# Patient Record
Sex: FEMALE | Race: WHITE | NOT HISPANIC OR LATINO | Employment: FULL TIME
[De-identification: names, ages, dates, MRNs, and addresses within clinical notes are randomized per-mention and may not be internally consistent; named-entity substitution may affect disease eponyms.]

---

## 2017-02-11 ENCOUNTER — RECORDS - HEALTHEAST (OUTPATIENT)
Dept: ADMINISTRATIVE | Facility: OTHER | Age: 42
End: 2017-02-11

## 2017-02-11 ENCOUNTER — HOSPITAL ENCOUNTER (OUTPATIENT)
Dept: CT IMAGING | Facility: HOSPITAL | Age: 42
Discharge: HOME | End: 2017-02-11
Attending: FAMILY MEDICINE

## 2017-02-11 DIAGNOSIS — R10.9 LT FLANK PAIN: ICD-10-CM

## 2017-02-13 ENCOUNTER — COMMUNICATION - HEALTHEAST (OUTPATIENT)
Dept: NEUROSURGERY | Facility: CLINIC | Age: 42
End: 2017-02-13

## 2017-03-22 ENCOUNTER — HOSPITAL ENCOUNTER (OUTPATIENT)
Dept: MRI IMAGING | Facility: HOSPITAL | Age: 42
Discharge: HOME | End: 2017-03-22
Attending: NEUROLOGICAL SURGERY

## 2017-03-22 DIAGNOSIS — D32.9 MENINGIOMA (H): ICD-10-CM

## 2017-04-11 ENCOUNTER — COMMUNICATION - HEALTHEAST (OUTPATIENT)
Dept: NEUROSURGERY | Facility: CLINIC | Age: 42
End: 2017-04-11

## 2017-04-11 DIAGNOSIS — D32.9 MENINGIOMA (H): ICD-10-CM

## 2019-07-25 ENCOUNTER — RECORDS - HEALTHEAST (OUTPATIENT)
Dept: LAB | Facility: CLINIC | Age: 44
End: 2019-07-25

## 2019-07-25 LAB
CHOLEST SERPL-MCNC: 202 MG/DL
FASTING STATUS PATIENT QL REPORTED: ABNORMAL
HDLC SERPL-MCNC: 53 MG/DL
LDLC SERPL CALC-MCNC: 127 MG/DL
TRIGL SERPL-MCNC: 112 MG/DL

## 2020-01-24 ENCOUNTER — RECORDS - HEALTHEAST (OUTPATIENT)
Dept: ADMINISTRATIVE | Facility: OTHER | Age: 45
End: 2020-01-24

## 2020-02-06 ENCOUNTER — COMMUNICATION - HEALTHEAST (OUTPATIENT)
Dept: NEUROSURGERY | Facility: CLINIC | Age: 45
End: 2020-02-06

## 2020-02-11 ENCOUNTER — RECORDS - HEALTHEAST (OUTPATIENT)
Dept: ADMINISTRATIVE | Facility: OTHER | Age: 45
End: 2020-02-11

## 2020-02-11 ENCOUNTER — RECORDS - HEALTHEAST (OUTPATIENT)
Dept: RADIOLOGY | Facility: CLINIC | Age: 45
End: 2020-02-11

## 2020-03-13 ENCOUNTER — HOSPITAL ENCOUNTER (OUTPATIENT)
Dept: MAMMOGRAPHY | Facility: CLINIC | Age: 45
Discharge: HOME | End: 2020-03-13
Attending: OBSTETRICS & GYNECOLOGY

## 2020-03-13 DIAGNOSIS — Z12.31 VISIT FOR SCREENING MAMMOGRAM: ICD-10-CM

## 2020-03-19 ENCOUNTER — AMBULATORY - HEALTHEAST (OUTPATIENT)
Dept: NEUROSURGERY | Facility: CLINIC | Age: 45
End: 2020-03-19

## 2020-03-19 ENCOUNTER — RECORDS - HEALTHEAST (OUTPATIENT)
Dept: RADIOLOGY | Facility: CLINIC | Age: 45
End: 2020-03-19

## 2020-03-19 ENCOUNTER — RECORDS - HEALTHEAST (OUTPATIENT)
Dept: ADMINISTRATIVE | Facility: OTHER | Age: 45
End: 2020-03-19

## 2020-03-24 ENCOUNTER — OFFICE VISIT - HEALTHEAST (OUTPATIENT)
Dept: NEUROSURGERY | Facility: CLINIC | Age: 45
End: 2020-03-24

## 2020-03-24 ENCOUNTER — AMBULATORY - HEALTHEAST (OUTPATIENT)
Dept: NEUROSURGERY | Facility: CLINIC | Age: 45
End: 2020-03-24

## 2020-03-24 DIAGNOSIS — M54.16 LUMBAR RADICULOPATHY: ICD-10-CM

## 2020-03-24 DIAGNOSIS — D32.9 MENINGIOMA (H): ICD-10-CM

## 2020-03-24 RX ORDER — GABAPENTIN 300 MG/1
300 CAPSULE ORAL 3 TIMES DAILY
Status: SHIPPED | COMMUNITY
Start: 2020-03-17 | End: 2023-05-18

## 2020-03-24 ASSESSMENT — MIFFLIN-ST. JEOR: SCORE: 1633.11

## 2020-04-13 ENCOUNTER — COMMUNICATION - HEALTHEAST (OUTPATIENT)
Dept: PHYSICAL MEDICINE AND REHAB | Facility: CLINIC | Age: 45
End: 2020-04-13

## 2020-05-13 ENCOUNTER — COMMUNICATION - HEALTHEAST (OUTPATIENT)
Dept: PHYSICAL MEDICINE AND REHAB | Facility: CLINIC | Age: 45
End: 2020-05-13

## 2020-06-09 ENCOUNTER — HOSPITAL ENCOUNTER (OUTPATIENT)
Dept: PHYSICAL MEDICINE AND REHAB | Facility: CLINIC | Age: 45
Discharge: HOME | End: 2020-06-09
Attending: NEUROLOGICAL SURGERY

## 2020-06-09 DIAGNOSIS — M54.16 LUMBAR RADICULOPATHY: ICD-10-CM

## 2020-06-09 ASSESSMENT — MIFFLIN-ST. JEOR: SCORE: 1633.11

## 2020-09-10 ENCOUNTER — HOSPITAL ENCOUNTER (OUTPATIENT)
Dept: MRI IMAGING | Facility: CLINIC | Age: 45
Discharge: HOME | End: 2020-09-10
Attending: NEUROLOGICAL SURGERY

## 2020-09-10 DIAGNOSIS — D32.9 MENINGIOMA (H): ICD-10-CM

## 2020-09-15 ENCOUNTER — OFFICE VISIT - HEALTHEAST (OUTPATIENT)
Dept: NEUROSURGERY | Facility: CLINIC | Age: 45
End: 2020-09-15

## 2020-09-15 ENCOUNTER — COMMUNICATION - HEALTHEAST (OUTPATIENT)
Dept: NEUROSURGERY | Facility: CLINIC | Age: 45
End: 2020-09-15

## 2020-09-15 DIAGNOSIS — I67.1 NONRUPTURED CEREBRAL ANEURYSM: ICD-10-CM

## 2020-09-15 DIAGNOSIS — D32.9 MENINGIOMA (H): ICD-10-CM

## 2020-09-15 ASSESSMENT — MIFFLIN-ST. JEOR: SCORE: 1633.11

## 2021-06-04 VITALS
HEIGHT: 68 IN | SYSTOLIC BLOOD PRESSURE: 128 MMHG | WEIGHT: 210 LBS | HEART RATE: 97 BPM | BODY MASS INDEX: 31.83 KG/M2 | DIASTOLIC BLOOD PRESSURE: 93 MMHG | OXYGEN SATURATION: 97 %

## 2021-06-04 VITALS
SYSTOLIC BLOOD PRESSURE: 125 MMHG | BODY MASS INDEX: 31.83 KG/M2 | HEIGHT: 68 IN | HEART RATE: 90 BPM | DIASTOLIC BLOOD PRESSURE: 86 MMHG | WEIGHT: 210 LBS | OXYGEN SATURATION: 98 %

## 2021-06-04 VITALS — HEIGHT: 68 IN | BODY MASS INDEX: 31.83 KG/M2 | WEIGHT: 210 LBS

## 2021-06-05 NOTE — TELEPHONE ENCOUNTER
Patient had MRI brain w/o on 1/24/19 through Neurologist Dr. Serrato at [facility]. Pt stated she was told her meningoma has slightly increased in size. Patient will hand carry report and MRI from [facility] but please order any other imaging needed.

## 2021-06-07 NOTE — PROGRESS NOTES
Neurosurgery consultation was requested by: Analilia Angulo MD  Pain: Lumbar  Radicular Pain is present:  radiates into the right foot  Lhermitte sign: No  Motor complaints: Denies  Sensory complaints: numbness tingling occasional  Gait and balance issues: No  Bowel or bladder issues: No  Duration of SX is: 1 yr  The symptoms are worse with: walking  The symptoms are better with: laying sitting  Injury: Denies  Severity is: moderate-severe  Patient has tried the following conservative measures: chiropractor, injections, and PT  Missy Camara CMA

## 2021-06-07 NOTE — PROGRESS NOTES
I had the pleasure of seeing Patricia Pranke in follow-up in my neurosurgery clinic and for a new finding of radiculopathy on the right side secondary to a right lumbar 5 6 disc herniation.  Mary Lou is a 45-year-old whom I have been following for meningioma the left CP angle.  She has been doing well and has no new symptoms.  She of course has continued pain on the left side of her face as related to her surgery.  Otherwise she is denying any swallowing difficulties hearing loss or facial weakness.  Her most recent MRI her lesion has increased by few millimeters to 13 x 10 mm from 10 x 8 mm.  Regarding her radiculopathy she has had this for a long time and has had injections x2 with improvement in her pain which she describes a pain in the right thigh right lateral leg and medial dorsal aspect of the foot on the right.  She is also tried physical therapy.  She denies any weakness.  She had seen my orthopedic colleague Dr. Gunderson who recommended L5-6 microdiscectomy she is also seeing me for second opinion.    On physical exam patient is very pleasant and appropriate  Speech is clear fluent and appropriate  Face is symmetric throughout the forehead eyes and mouth  Extraocular muscles are intact bilaterally  Tongue and uvula elevate in the midline  Strength is full throughout the lower extremities  Gait is nonantalgic    Assessment and plan:  Mary Lou is a very pleasant 45-year-old with a presumed meningioma in the left CP angle.  Since this is grown in the leticia-of 2 years although slightly, I would recommend repeating an MRI with and without contrast of the brain in 6 months.  Should she develop any signs symptoms of growth from this I will see her sooner.  Regarding her L5-L6 disc herniation and radiculopathy I agree with microlumbar discectomy.  However her last injection was in August so I recommend another injection on the right side.  I will see her back in my clinic in 4 to 6 weeks if she has had no  improvement I would also recommend microdiscectomy I did encourage her to feel free to stay with my colleague Dr. Gunderson who is an excellent surgeon I would also be happy to take care of her as well if she chose to have me do her surgery.   Thank you for giving me the opportunity to see this very pleasant patient.  If you have any questions about him or any othe patient's please feel free to contact me.  Of note I spent greater than 45 minutes counseling the patient regarding her pathology and treatment plan.    Jason Wilder MD, FAANS

## 2021-06-07 NOTE — TELEPHONE ENCOUNTER
Patient contacted with updated provider recommendation that the lumbar epidural steroid injection the patient was currently scheduled to have completed by Dr. Yoon on 4/23/20 should now be scheduled on 5/18/20 or later due to safety concerns with the COVID-19 outbreak.    Rationale for the recommendation was provided to patient's stated understanding.    The patient did clarify that she does have a gadolinium contrast dye allergy, but has tolerated the iodine contrast dye that has been used with previous lumbar ESIs at [facility].    The patient was informed that she should contact the [facility] if her condition worsened or if she was interested in scheduling a phone appointment with a provider.    The patient verbalized understanding of the plan and was informed that a member of the [facility] scheduling team would contact her to reschedule her injection appointment.

## 2021-06-08 NOTE — TELEPHONE ENCOUNTER
Patient contacted to make her aware that the right L5-6 epidural steroid injection she was scheduled to have at the [facility] on 5/21/20 by Dr. Yoon was being recommended to be rescheduled until 7/6/20 or later due to safety concerns regarding the COVID-19 outbreak.  Rationale for the recommendation was provided to the patient's verbalized understanding.    The patient states that her pain is tolerable most days, but that the pain does limit her activities of daily living and she is unsure she can wait until July to have the procedure.    The patient was made that given her circumstance Dr. Yoon would be able to perform her procedure on 5/28/20 and she was interested in scheduling for that date.    Procedure requirements were reviewed with the patient and she was informed that she would need to wear a mask the day of her injection.    She was encouraged to contact the [facility] if she had any questions or concerns prior to her scheduled injection appointment date of 5/28/20.

## 2021-06-08 NOTE — PATIENT INSTRUCTIONS - HE
DISCHARGE INSTRUCTIONS    During office hours (8:00 a.m.- 4:00 p.m.) questions or concerns may be answered  by calling [facility] Navigation Nurses at  [phone].  Messages received after hours will be returned the following business day.      In the case of an emergency, please dial 911 or seek assistance at the nearest Emergency Room/Urgent Care facility.     All Patients:    ? You may experience an increase in your symptoms for the first 2 days (It may take anywhere between 2 days- 2 weeks for the steroid to have maximum effect).    ? You may use ice on the injection site, as frequently as 20 minutes each hour if needed.    ? You may take your pain medicine.    ? You may continue taking your regular medication after your injection. If you have had a Medial Branch Block you may resume pain medication once your pain diary is completed.    ? You may shower. No swimming, tub bath or hot tub for 48 hours.  You may remove your bandaid/bandage as soon as you are home.    ? You may resume light activities, as tolerated.    ? Resume your usual diet as tolerated.    ? It is strongly advised that you do not drive for 1-3 hours post injection.    ? If you have had oral sedation:  Do not drive for 8 hours post injection.      ? If you have had IV sedation:  Do not drive for 24 hours post injection.  Do not operate hazardous machinery or make important personal/business decisions for 24 hours.      POSSIBLE STEROID SIDE EFFECTS (If steroid/cortisone was used for your procedure)    -If you experience these symptoms, it should only last for a short period      Swelling of the legs                Skin redness (flushing)       Mouth (oral) irritation     Blood sugar (glucose) levels              Sweats                      Mood changes    Headache    Sleeplessness         POSSIBLE PROCEDURE SIDE EFFECTS  -Call the [facility] if you are concerned    Increased Pain             Increased numbness/tingling         Nausea/Vomiting            Bruising/bleeding at site        Redness or swelling                                                Difficulty walking        Weakness             Fever greater than 100.5    *In the event of a severe headache after an epidural steroid injection that is relieved by lying down, please call the [facility] to speak with a clinical staff member*

## 2021-06-11 NOTE — TELEPHONE ENCOUNTER
Called and let Mary Lou know that Dr. Wilder wants yearly brain MRI without contrast for surveillance of her CPA angle meningioma.  Ordered in MRI 1 year with CC.  Evelin Bell, RN, CNRN

## 2021-06-11 NOTE — PATIENT INSTRUCTIONS - HE
Imaging was stable, we will see you for follow up imaging in 6-12 months. No contrast. Call us if you have new symptoms, questions/concerns.

## 2021-06-11 NOTE — PROGRESS NOTES
"CHART NOTE     DATE OF SERVICE:  9/15/2020     : 1975   45 y.o.     ASSESSMENT :   Stable CP angle meningioma.  No new symptoms. Her back and right leg leg pain is much improved compared to prior as well. She is following with Dr. Gunderson.    PLAN:   Per Dr. Wilder, yearly MRI without contrast for meningioma.    HPI:  From Dr. wilder's previous consultation \"Mary Lou is a 45-year-old whom I have been following for meningioma the left CP angle.  She has been doing well and has no new symptoms.  She of course has continued pain on the left side of her face as related to her surgery.  Otherwise she is denying any swallowing difficulties hearing loss or facial weakness.  Her most recent MRI her lesion has increased by few millimeters to 13 x 10 mm from 10 x 8 mm.  Regarding her radiculopathy she has had this for a long time and has had injections x2 with improvement in her pain which she describes a pain in the right thigh right lateral leg and medial dorsal aspect of the foot on the right.  She is also tried physical therapy.  She denies any weakness.  She had seen my orthopedic colleague Dr. Gunderson who recommended L5-6 microdiscectomy she is also seeing me for second opinion\"          PAST MEDICAL HISTORY, SURGICAL HISTORY, REVIEW OF SYMPTOMS, MEDICATIONS AND ALLERGIES:  Past medical history, surgical history, ROS, medications and allergies reviewed with patient and remain unchanged from previous visit.    Past Medical History:   Diagnosis Date     Anemia      Menorrhagia        PHYSICAL EXAM:    BP (!) 128/93   Pulse 97   Ht 5' 7.5\" (1.715 m)   Wt 210 lb (95.3 kg)   SpO2 97%   BMI 32.41 kg/m      Neurological exam reveals:  Respirations easy, non-labored.   Skin: W/D/I. No rashes, lesions or breaks in integrity.   Recent and remote memory intact, fund of knowledge wnl.    Alert and oriented x3, speech fluent and appropriate.   Comprehension intact with 2 step crossover command.   Finger nose " finger smooth and accurate  Counts by Serial 7s with ease, Spells WORLD forward and in reverse.   PERRL, EOMI, No nystagmus,   Face symmetric, tongue midline, Uvula midline,  palate rises with phonation   Shoulder shrug equal  BONE x5/5  No extremity edema noted.   Can heel/toe walk, do toe rises and squats without difficulty.   Muscle Bulk and tone wnl.   Reflexes: No pathological reflexes   Gait and station:Normal  Incision: CDI without erythema or edema  NDI/CARMINE:      RADIOGRAPHIC IMAGING: MRI reviewed, and reviewed with Dr. Wilder.  Films personally reviewed and interpreted.   Reviewed imaging with patient and family.      Alice Sahu CNP  [facility]  O: [phone]  P: [phone]

## 2021-10-07 ENCOUNTER — TELEPHONE (OUTPATIENT)
Dept: NEUROSURGERY | Facility: CLINIC | Age: 46
End: 2021-10-07

## 2021-10-07 DIAGNOSIS — D32.9 MENINGIOMA (H): Primary | ICD-10-CM

## 2021-10-07 NOTE — TELEPHONE ENCOUNTER
New order placed for brain MRI without contrast per last NP note and pt's allergy to contrast.     Donan Holman RN

## 2021-11-03 ENCOUNTER — HOSPITAL ENCOUNTER (OUTPATIENT)
Dept: MRI IMAGING | Facility: HOSPITAL | Age: 46
Discharge: HOME | End: 2021-11-03
Attending: NEUROLOGICAL SURGERY | Admitting: NEUROLOGICAL SURGERY
Payer: COMMERCIAL

## 2021-11-03 DIAGNOSIS — D32.9 MENINGIOMA (H): ICD-10-CM

## 2021-11-03 PROCEDURE — 70551 MRI BRAIN STEM W/O DYE: CPT

## 2021-11-04 ENCOUNTER — OFFICE VISIT (OUTPATIENT)
Dept: NEUROSURGERY | Facility: CLINIC | Age: 46
End: 2021-11-04
Payer: COMMERCIAL

## 2021-11-04 VITALS
SYSTOLIC BLOOD PRESSURE: 118 MMHG | OXYGEN SATURATION: 98 % | HEART RATE: 88 BPM | DIASTOLIC BLOOD PRESSURE: 78 MMHG | RESPIRATION RATE: 18 BRPM

## 2021-11-04 DIAGNOSIS — D32.9 MENINGIOMA (H): Primary | ICD-10-CM

## 2021-11-04 PROCEDURE — 99213 OFFICE O/P EST LOW 20 MIN: CPT | Performed by: NURSE PRACTITIONER

## 2021-11-04 NOTE — LETTER
11/4/2021         RE: Patricia A Pranke  [address] [facility] [address]        Dear Colleague,    Thank you for referring your patient, Patricia A Pranke, to the [facility]. Please see a copy of my visit note below.    Neurosurgery clinic note:  11/9/21    A/P:   Mary Lou is a 45yo F chronic left facial pain r/t sinus surgery in 2015 who we've been following since 2015 for an incidentally found left CP angle meningioma. Scans were stable from Nov 2015-Mar 2017. Then her lesion was found to have increased by few millimeters in Jan 2020. Repeat scans in March 2020 and sept 2020 were stable. Recommended repeat MRI in 1 yr.     No new symptoms. Denies HA, weakness, dizziness, n/v, hearing loss, or swallowing difficulties. Her new MRI brain is stable today. We will plan to repeat another brain MRI without contrast in 1 yr, sooner if concerns. If several annual scans are stable, can likely go to every 2-3 yrs.    PLAN:   1. Continue annual brain MRIs without contrast  2. If any s/s growth, call sooner and we would repeat imaging sooner      PHYSICAL EXAM:      Recent and remote memory intact, fund of knowledge wnl.    Alert and oriented x3, speech fluent and appropriate.     Finger nose finger smooth and accurate  PERRL, EOMI, No nystagmus,     Face symmetric, tongue midline, Uvula midline,  palate rises with phonation     Shoulder shrug equal    BONE x4, full strength throughout both upper and lower extremities    Sensation of upper and lower extremities is intact    Gait and station:Normal    IMAGING:   Brain MRI images were personally reviewed and shown to patient-     EXAM: MR BRAIN WO CONTRAST  LOCATION: [facility]  DATE/TIME: 9/10/2020 11:28 AM     INDICATION: Brain mass or lesion, follow-up.  COMPARISON: Head MRI 03/22/2017 and head MRI 09/11/2015.  TECHNIQUE: Routine multiplanar multisequence head MRI without intravenous contrast.     FINDINGS:  INTRACRANIAL  CONTENTS: No acute or subacute infarct. 12.1 x 8.2 mm presumed meningioma in the left cerebellopontine angle cistern. It measured 11.7 x 6.6 mm on 03/22/2017 and 12.0 x 7.7 mm on 09/11/2015. Findings are not significantly changed when   accounting for differences in image acquisition. Scattered nonspecific T2/FLAIR hyperintensities within the cerebral white matter most consistent with mild chronic microvascular ischemic change. Normal ventricles and sulci. Normal position of the   cerebellar tonsils.      SELLA: No abnormality accounting for technique.     OSSEOUS STRUCTURES/SOFT TISSUES: Normal marrow signal. The major intracranial vascular flow voids are maintained.      ORBITS: No abnormality accounting for technique.      SINUSES/MASTOIDS: No paranasal sinus mucosal disease. No middle ear or mastoid effusion.      IMPRESSION:  1.  12.1 x 8.7 mm presumed meningioma in the left cerebellopontine angle cistern.  2.  When accounting for differences in image acquisition it has not significantly changed dating back to 09/11/2015.  3.  Incidental scattered foci of white matter T2 prolongation. Unchanged.        Alicia Covington FNP-C  [facility]  O. [phone]        Again, thank you for allowing me to participate in the care of your patient.        Sincerely,        Alicia Covington, YANDY CNP

## 2021-11-09 NOTE — PROGRESS NOTES
Neurosurgery clinic note:  11/9/21    A/P:   Mary Lou is a 47yo F chronic left facial pain r/t sinus surgery in 2015 who we've been following since 2015 for an incidentally found left CP angle meningioma. Scans were stable from Nov 2015-Mar 2017. Then her lesion was found to have increased by few millimeters in Jan 2020. Repeat scans in March 2020 and sept 2020 were stable. Recommended repeat MRI in 1 yr.     No new symptoms. Denies HA, weakness, dizziness, n/v, hearing loss, or swallowing difficulties. Her new MRI brain is stable today. We will plan to repeat another brain MRI without contrast in 1 yr, sooner if concerns. If several annual scans are stable, can likely go to every 2-3 yrs.    PLAN:   1. Continue annual brain MRIs without contrast  2. If any s/s growth, call sooner and we would repeat imaging sooner      PHYSICAL EXAM:      Recent and remote memory intact, fund of knowledge wnl.    Alert and oriented x3, speech fluent and appropriate.     Finger nose finger smooth and accurate  PERRL, EOMI, No nystagmus,     Face symmetric, tongue midline, Uvula midline,  palate rises with phonation     Shoulder shrug equal    BONE x4, full strength throughout both upper and lower extremities    Sensation of upper and lower extremities is intact    Gait and station:Normal    IMAGING:   Brain MRI images were personally reviewed and shown to patient-     EXAM: MR BRAIN WO CONTRAST  LOCATION: [facility]  DATE/TIME: 9/10/2020 11:28 AM     INDICATION: Brain mass or lesion, follow-up.  COMPARISON: Head MRI 03/22/2017 and head MRI 09/11/2015.  TECHNIQUE: Routine multiplanar multisequence head MRI without intravenous contrast.     FINDINGS:  INTRACRANIAL CONTENTS: No acute or subacute infarct. 12.1 x 8.2 mm presumed meningioma in the left cerebellopontine angle cistern. It measured 11.7 x 6.6 mm on 03/22/2017 and 12.0 x 7.7 mm on 09/11/2015. Findings are not significantly changed when   accounting for differences  in image acquisition. Scattered nonspecific T2/FLAIR hyperintensities within the cerebral white matter most consistent with mild chronic microvascular ischemic change. Normal ventricles and sulci. Normal position of the   cerebellar tonsils.      SELLA: No abnormality accounting for technique.     OSSEOUS STRUCTURES/SOFT TISSUES: Normal marrow signal. The major intracranial vascular flow voids are maintained.      ORBITS: No abnormality accounting for technique.      SINUSES/MASTOIDS: No paranasal sinus mucosal disease. No middle ear or mastoid effusion.      IMPRESSION:  1.  12.1 x 8.7 mm presumed meningioma in the left cerebellopontine angle cistern.  2.  When accounting for differences in image acquisition it has not significantly changed dating back to 09/11/2015.  3.  Incidental scattered foci of white matter T2 prolongation. Unchanged.        Alicia Covington P-C  [facility]  O. [phone]

## 2021-12-18 ENCOUNTER — HOSPITAL ENCOUNTER (OUTPATIENT)
Dept: MAMMOGRAPHY | Facility: CLINIC | Age: 46
Discharge: HOME | End: 2021-12-18
Attending: OBSTETRICS & GYNECOLOGY | Admitting: OBSTETRICS & GYNECOLOGY
Payer: COMMERCIAL

## 2021-12-18 DIAGNOSIS — Z12.31 SCREENING MAMMOGRAM, ENCOUNTER FOR: ICD-10-CM

## 2021-12-18 PROCEDURE — 77063 BREAST TOMOSYNTHESIS BI: CPT

## 2022-01-15 ENCOUNTER — HEALTH MAINTENANCE LETTER (OUTPATIENT)
Age: 47
End: 2022-01-15

## 2022-12-26 ENCOUNTER — HEALTH MAINTENANCE LETTER (OUTPATIENT)
Age: 47
End: 2022-12-26

## 2023-04-16 ENCOUNTER — HEALTH MAINTENANCE LETTER (OUTPATIENT)
Age: 48
End: 2023-04-16

## 2023-05-09 ENCOUNTER — TRANSFERRED RECORDS (OUTPATIENT)
Dept: HEALTH INFORMATION MANAGEMENT | Facility: CLINIC | Age: 48
End: 2023-05-09
Payer: COMMERCIAL

## 2023-05-18 RX ORDER — NORETHINDRONE ACETATE 5 MG
5 TABLET ORAL DAILY
COMMUNITY

## 2023-05-18 RX ORDER — CITALOPRAM HYDROBROMIDE 20 MG/1
20 TABLET ORAL DAILY
Status: ON HOLD | COMMUNITY
End: 2023-05-23

## 2023-05-18 RX ORDER — BACLOFEN 10 MG/1
10 TABLET ORAL 3 TIMES DAILY
Status: ON HOLD | COMMUNITY
End: 2023-05-23

## 2023-05-23 ENCOUNTER — ANESTHESIA EVENT (OUTPATIENT)
Dept: SURGERY | Facility: CLINIC | Age: 48
End: 2023-05-23
Payer: COMMERCIAL

## 2023-05-23 ENCOUNTER — HOSPITAL ENCOUNTER (OUTPATIENT)
Facility: CLINIC | Age: 48
Discharge: HOME | End: 2023-05-23
Attending: OBSTETRICS & GYNECOLOGY | Admitting: OBSTETRICS & GYNECOLOGY
Payer: COMMERCIAL

## 2023-05-23 ENCOUNTER — ANESTHESIA (OUTPATIENT)
Dept: SURGERY | Facility: CLINIC | Age: 48
End: 2023-05-23
Payer: COMMERCIAL

## 2023-05-23 VITALS
HEART RATE: 61 BPM | OXYGEN SATURATION: 94 % | HEIGHT: 66 IN | TEMPERATURE: 99.2 F | WEIGHT: 230 LBS | SYSTOLIC BLOOD PRESSURE: 102 MMHG | DIASTOLIC BLOOD PRESSURE: 70 MMHG | RESPIRATION RATE: 16 BRPM | BODY MASS INDEX: 36.96 KG/M2

## 2023-05-23 DIAGNOSIS — N84.0 ABNORMAL UTERINE BLEEDING DUE TO ENDOMETRIAL POLYP: Primary | ICD-10-CM

## 2023-05-23 DIAGNOSIS — N93.9 ABNORMAL UTERINE BLEEDING DUE TO ENDOMETRIAL POLYP: Primary | ICD-10-CM

## 2023-05-23 PROBLEM — Z14.8 CARRIER OF GENE FOR LYNCH SYNDROME: Status: ACTIVE | Noted: 2023-05-23

## 2023-05-23 LAB
ABO/RH(D): NORMAL
ANTIBODY SCREEN: NEGATIVE
BASOPHILS # BLD AUTO: 0.1 10E3/UL (ref 0–0.2)
BASOPHILS NFR BLD AUTO: 1 %
EOSINOPHIL # BLD AUTO: 0.2 10E3/UL (ref 0–0.7)
EOSINOPHIL NFR BLD AUTO: 3 %
ERYTHROCYTE [DISTWIDTH] IN BLOOD BY AUTOMATED COUNT: 15.1 % (ref 10–15)
HCG UR QL: NEGATIVE
HCT VFR BLD AUTO: 36.7 % (ref 35–47)
HGB BLD-MCNC: 11.1 G/DL (ref 11.7–15.7)
IMM GRANULOCYTES # BLD: 0 10E3/UL
IMM GRANULOCYTES NFR BLD: 0 %
LYMPHOCYTES # BLD AUTO: 1.3 10E3/UL (ref 0.8–5.3)
LYMPHOCYTES NFR BLD AUTO: 20 %
MCH RBC QN AUTO: 24.1 PG (ref 26.5–33)
MCHC RBC AUTO-ENTMCNC: 30.2 G/DL (ref 31.5–36.5)
MCV RBC AUTO: 80 FL (ref 78–100)
MONOCYTES # BLD AUTO: 0.4 10E3/UL (ref 0–1.3)
MONOCYTES NFR BLD AUTO: 6 %
NEUTROPHILS # BLD AUTO: 4.8 10E3/UL (ref 1.6–8.3)
NEUTROPHILS NFR BLD AUTO: 70 %
NRBC # BLD AUTO: 0 10E3/UL
NRBC BLD AUTO-RTO: 0 /100
PLATELET # BLD AUTO: 274 10E3/UL (ref 150–450)
RBC # BLD AUTO: 4.61 10E6/UL (ref 3.8–5.2)
SPECIMEN EXPIRATION DATE: NORMAL
WBC # BLD AUTO: 6.8 10E3/UL (ref 4–11)

## 2023-05-23 PROCEDURE — 258N000001 HC RX 258: Performed by: OBSTETRICS & GYNECOLOGY

## 2023-05-23 PROCEDURE — 88305 TISSUE EXAM BY PATHOLOGIST: CPT | Mod: 26 | Performed by: PATHOLOGY

## 2023-05-23 PROCEDURE — 88305 TISSUE EXAM BY PATHOLOGIST: CPT | Mod: TC | Performed by: OBSTETRICS & GYNECOLOGY

## 2023-05-23 PROCEDURE — 250N000011 HC RX IP 250 OP 636: Performed by: NURSE ANESTHETIST, CERTIFIED REGISTERED

## 2023-05-23 PROCEDURE — 36415 COLL VENOUS BLD VENIPUNCTURE: CPT | Performed by: OBSTETRICS & GYNECOLOGY

## 2023-05-23 PROCEDURE — 258N000003 HC RX IP 258 OP 636: Performed by: ANESTHESIOLOGY

## 2023-05-23 PROCEDURE — 86901 BLOOD TYPING SEROLOGIC RH(D): CPT | Performed by: OBSTETRICS & GYNECOLOGY

## 2023-05-23 PROCEDURE — 86850 RBC ANTIBODY SCREEN: CPT | Performed by: OBSTETRICS & GYNECOLOGY

## 2023-05-23 PROCEDURE — 370N000017 HC ANESTHESIA TECHNICAL FEE, PER MIN: Performed by: OBSTETRICS & GYNECOLOGY

## 2023-05-23 PROCEDURE — 250N000011 HC RX IP 250 OP 636: Performed by: OBSTETRICS & GYNECOLOGY

## 2023-05-23 PROCEDURE — 360N000076 HC SURGERY LEVEL 3, PER MIN: Performed by: OBSTETRICS & GYNECOLOGY

## 2023-05-23 PROCEDURE — 85025 COMPLETE CBC W/AUTO DIFF WBC: CPT | Performed by: OBSTETRICS & GYNECOLOGY

## 2023-05-23 PROCEDURE — 88341 IMHCHEM/IMCYTCHM EA ADD ANTB: CPT | Mod: 26 | Performed by: PATHOLOGY

## 2023-05-23 PROCEDURE — 999N000141 HC STATISTIC PRE-PROCEDURE NURSING ASSESSMENT: Performed by: OBSTETRICS & GYNECOLOGY

## 2023-05-23 PROCEDURE — 250N000009 HC RX 250: Performed by: OBSTETRICS & GYNECOLOGY

## 2023-05-23 PROCEDURE — 81025 URINE PREGNANCY TEST: CPT | Performed by: OBSTETRICS & GYNECOLOGY

## 2023-05-23 PROCEDURE — 710N000012 HC RECOVERY PHASE 2, PER MINUTE: Performed by: OBSTETRICS & GYNECOLOGY

## 2023-05-23 PROCEDURE — 272N000001 HC OR GENERAL SUPPLY STERILE: Performed by: OBSTETRICS & GYNECOLOGY

## 2023-05-23 PROCEDURE — 88342 IMHCHEM/IMCYTCHM 1ST ANTB: CPT | Mod: 26 | Performed by: PATHOLOGY

## 2023-05-23 PROCEDURE — 250N000013 HC RX MED GY IP 250 OP 250 PS 637: Performed by: OBSTETRICS & GYNECOLOGY

## 2023-05-23 RX ORDER — NALOXONE HYDROCHLORIDE 0.4 MG/ML
0.2 INJECTION, SOLUTION INTRAMUSCULAR; INTRAVENOUS; SUBCUTANEOUS
Status: DISCONTINUED | OUTPATIENT
Start: 2023-05-23 | End: 2023-05-23 | Stop reason: HOSPADM

## 2023-05-23 RX ORDER — IBUPROFEN 200 MG
800 TABLET ORAL EVERY 6 HOURS PRN
COMMUNITY
Start: 2023-05-23

## 2023-05-23 RX ORDER — CEFAZOLIN SODIUM/WATER 2 G/20 ML
2 SYRINGE (ML) INTRAVENOUS SEE ADMIN INSTRUCTIONS
Status: DISCONTINUED | OUTPATIENT
Start: 2023-05-23 | End: 2023-05-23 | Stop reason: HOSPADM

## 2023-05-23 RX ORDER — FENTANYL CITRATE 50 UG/ML
50 INJECTION, SOLUTION INTRAMUSCULAR; INTRAVENOUS EVERY 5 MIN PRN
Status: DISCONTINUED | OUTPATIENT
Start: 2023-05-23 | End: 2023-05-23 | Stop reason: HOSPADM

## 2023-05-23 RX ORDER — ACETAMINOPHEN 325 MG/1
975 TABLET ORAL ONCE
Status: COMPLETED | OUTPATIENT
Start: 2023-05-23 | End: 2023-05-23

## 2023-05-23 RX ORDER — ONDANSETRON 4 MG/1
4 TABLET, ORALLY DISINTEGRATING ORAL EVERY 30 MIN PRN
Status: DISCONTINUED | OUTPATIENT
Start: 2023-05-23 | End: 2023-05-23 | Stop reason: HOSPADM

## 2023-05-23 RX ORDER — ACETAMINOPHEN 325 MG/1
975 TABLET ORAL ONCE
Status: DISCONTINUED | OUTPATIENT
Start: 2023-05-23 | End: 2023-05-23 | Stop reason: HOSPADM

## 2023-05-23 RX ORDER — HYDROMORPHONE HCL IN WATER/PF 6 MG/30 ML
0.4 PATIENT CONTROLLED ANALGESIA SYRINGE INTRAVENOUS EVERY 5 MIN PRN
Status: DISCONTINUED | OUTPATIENT
Start: 2023-05-23 | End: 2023-05-23 | Stop reason: HOSPADM

## 2023-05-23 RX ORDER — CEFAZOLIN SODIUM/WATER 2 G/20 ML
2 SYRINGE (ML) INTRAVENOUS
Status: COMPLETED | OUTPATIENT
Start: 2023-05-23 | End: 2023-05-23

## 2023-05-23 RX ORDER — LIDOCAINE 40 MG/G
CREAM TOPICAL
Status: DISCONTINUED | OUTPATIENT
Start: 2023-05-23 | End: 2023-05-23 | Stop reason: HOSPADM

## 2023-05-23 RX ORDER — CITALOPRAM HYDROBROMIDE 10 MG/1
20 TABLET ORAL DAILY
COMMUNITY

## 2023-05-23 RX ORDER — PROPOFOL 10 MG/ML
INJECTION, EMULSION INTRAVENOUS CONTINUOUS PRN
Status: DISCONTINUED | OUTPATIENT
Start: 2023-05-23 | End: 2023-05-23

## 2023-05-23 RX ORDER — IBUPROFEN 400 MG/1
800 TABLET, FILM COATED ORAL ONCE
Status: DISCONTINUED | OUTPATIENT
Start: 2023-05-23 | End: 2023-05-23 | Stop reason: HOSPADM

## 2023-05-23 RX ORDER — HYDROMORPHONE HCL IN WATER/PF 6 MG/30 ML
0.2 PATIENT CONTROLLED ANALGESIA SYRINGE INTRAVENOUS EVERY 5 MIN PRN
Status: DISCONTINUED | OUTPATIENT
Start: 2023-05-23 | End: 2023-05-23 | Stop reason: HOSPADM

## 2023-05-23 RX ORDER — SUMATRIPTAN 100 MG/1
100 TABLET, FILM COATED ORAL
COMMUNITY

## 2023-05-23 RX ORDER — ONDANSETRON 2 MG/ML
4 INJECTION INTRAMUSCULAR; INTRAVENOUS EVERY 30 MIN PRN
Status: DISCONTINUED | OUTPATIENT
Start: 2023-05-23 | End: 2023-05-23 | Stop reason: HOSPADM

## 2023-05-23 RX ORDER — OXYCODONE HYDROCHLORIDE 5 MG/1
5 TABLET ORAL EVERY 4 HOURS PRN
Status: DISCONTINUED | OUTPATIENT
Start: 2023-05-23 | End: 2023-05-23 | Stop reason: HOSPADM

## 2023-05-23 RX ORDER — OXYCODONE HYDROCHLORIDE 5 MG/1
10 TABLET ORAL EVERY 4 HOURS PRN
Status: DISCONTINUED | OUTPATIENT
Start: 2023-05-23 | End: 2023-05-23 | Stop reason: HOSPADM

## 2023-05-23 RX ORDER — ACETAMINOPHEN 325 MG/1
975 TABLET ORAL EVERY 4 HOURS PRN
Status: DISCONTINUED | OUTPATIENT
Start: 2023-05-23 | End: 2023-05-23 | Stop reason: HOSPADM

## 2023-05-23 RX ORDER — SODIUM CHLORIDE, SODIUM LACTATE, POTASSIUM CHLORIDE, CALCIUM CHLORIDE 600; 310; 30; 20 MG/100ML; MG/100ML; MG/100ML; MG/100ML
INJECTION, SOLUTION INTRAVENOUS CONTINUOUS
Status: DISCONTINUED | OUTPATIENT
Start: 2023-05-23 | End: 2023-05-23 | Stop reason: HOSPADM

## 2023-05-23 RX ORDER — FENTANYL CITRATE 50 UG/ML
25 INJECTION, SOLUTION INTRAMUSCULAR; INTRAVENOUS
Status: DISCONTINUED | OUTPATIENT
Start: 2023-05-23 | End: 2023-05-23 | Stop reason: HOSPADM

## 2023-05-23 RX ORDER — ACETAMINOPHEN 325 MG/1
650-975 TABLET ORAL EVERY 6 HOURS PRN
Qty: 50 TABLET | Refills: 0 | COMMUNITY
Start: 2023-05-23

## 2023-05-23 RX ORDER — NALOXONE HYDROCHLORIDE 0.4 MG/ML
0.4 INJECTION, SOLUTION INTRAMUSCULAR; INTRAVENOUS; SUBCUTANEOUS
Status: DISCONTINUED | OUTPATIENT
Start: 2023-05-23 | End: 2023-05-23 | Stop reason: HOSPADM

## 2023-05-23 RX ORDER — FENTANYL CITRATE 50 UG/ML
INJECTION, SOLUTION INTRAMUSCULAR; INTRAVENOUS PRN
Status: DISCONTINUED | OUTPATIENT
Start: 2023-05-23 | End: 2023-05-23

## 2023-05-23 RX ORDER — LIDOCAINE HYDROCHLORIDE 10 MG/ML
INJECTION, SOLUTION INFILTRATION; PERINEURAL PRN
Status: DISCONTINUED | OUTPATIENT
Start: 2023-05-23 | End: 2023-05-23 | Stop reason: HOSPADM

## 2023-05-23 RX ORDER — DEXAMETHASONE SODIUM PHOSPHATE 10 MG/ML
INJECTION, SOLUTION INTRAMUSCULAR; INTRAVENOUS PRN
Status: DISCONTINUED | OUTPATIENT
Start: 2023-05-23 | End: 2023-05-23

## 2023-05-23 RX ORDER — OXYCODONE HYDROCHLORIDE 5 MG/1
5 TABLET ORAL
Status: DISCONTINUED | OUTPATIENT
Start: 2023-05-23 | End: 2023-05-23 | Stop reason: HOSPADM

## 2023-05-23 RX ORDER — KETOROLAC TROMETHAMINE 30 MG/ML
30 INJECTION, SOLUTION INTRAMUSCULAR; INTRAVENOUS ONCE
Status: COMPLETED | OUTPATIENT
Start: 2023-05-23 | End: 2023-05-23

## 2023-05-23 RX ORDER — FENTANYL CITRATE 50 UG/ML
25 INJECTION, SOLUTION INTRAMUSCULAR; INTRAVENOUS EVERY 5 MIN PRN
Status: DISCONTINUED | OUTPATIENT
Start: 2023-05-23 | End: 2023-05-23 | Stop reason: HOSPADM

## 2023-05-23 RX ORDER — ONDANSETRON 2 MG/ML
INJECTION INTRAMUSCULAR; INTRAVENOUS PRN
Status: DISCONTINUED | OUTPATIENT
Start: 2023-05-23 | End: 2023-05-23

## 2023-05-23 RX ADMIN — ONDANSETRON 4 MG: 2 INJECTION INTRAMUSCULAR; INTRAVENOUS at 11:02

## 2023-05-23 RX ADMIN — PROPOFOL 200 MCG/KG/MIN: 10 INJECTION, EMULSION INTRAVENOUS at 10:56

## 2023-05-23 RX ADMIN — ACETAMINOPHEN 975 MG: 325 TABLET ORAL at 10:12

## 2023-05-23 RX ADMIN — FENTANYL CITRATE 100 MCG: 50 INJECTION, SOLUTION INTRAMUSCULAR; INTRAVENOUS at 10:55

## 2023-05-23 RX ADMIN — DEXAMETHASONE SODIUM PHOSPHATE 10 MG: 10 INJECTION, SOLUTION INTRAMUSCULAR; INTRAVENOUS at 11:01

## 2023-05-23 RX ADMIN — KETOROLAC TROMETHAMINE 30 MG: 30 INJECTION, SOLUTION INTRAMUSCULAR at 10:13

## 2023-05-23 RX ADMIN — MIDAZOLAM 2 MG: 1 INJECTION INTRAMUSCULAR; INTRAVENOUS at 10:50

## 2023-05-23 RX ADMIN — SODIUM CHLORIDE, POTASSIUM CHLORIDE, SODIUM LACTATE AND CALCIUM CHLORIDE: 600; 310; 30; 20 INJECTION, SOLUTION INTRAVENOUS at 10:04

## 2023-05-23 RX ADMIN — Medication 2 G: at 10:50

## 2023-05-23 ASSESSMENT — ACTIVITIES OF DAILY LIVING (ADL)
ADLS_ACUITY_SCORE: 35
ADLS_ACUITY_SCORE: 35

## 2023-05-23 NOTE — OP NOTE
[facility] Operative Note    Patient Name: Patricia A Pranke   Patient :  1975  Patient MRN:  [id number]    Procedure date: 2023    Pre-operative diagnosis: Abnormal uterine bleeding [N93.9]  Endometrial polyp [N84.0]  Family history of Pringle syndrome [Z80.0]      Post-operative diagnosis: Same     Procedure: Procedure(s):  HYSTEROSCOPY, WITH DILATION AND CURETTAGE, ENDOMETRIAL POLYPECTOMY with directed biopsy     Surgeon: Analilia Angulo MD     Assistants(s): n/a     Anesthesia: MAC with Local paracervical block using 1% Lidocaine      Estimated blood loss: 10 ml     Specimens: ID Type Source Tests Collected by Time Destination   1 : Endometrial Curettings  Tissue Endometrium SURGICAL PATHOLOGY EXAM Analilia Angulo MD 2023 11:19 AM    2 : Endometrial Biopsy and Polyp Polyp Endometrium SURGICAL PATHOLOGY EXAM Analilia Angulo MD 2023 11:19 AM          Indications:              Findings:   Patient presents for scheduled surgery - had endometrial biopsy in office for evaluation of abnormal uterine bleeding with fragment of polyp detected.  Recommended removal and further sampling of endometrium.  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     Anteverted uterus.  There is a small endometrial polyp noted at the anterior wall of the uterus at the internal os, removed.  There is a larger polyp appreciated at the fundal anterior wall, removed.  Both tubal ostia identified.  The right tubal region had a somewhat rough texture, this was sampled with myosure under visualization and sent with polyp pathology.  Moderate amount of endometrial curettings.      Complications: None.     Condition: Stable       Description of procedure:  After both verbal and digital written consent was obtained, patient was taken to the Operating Room at [facility] where anesthesia was administered without difficulty.  She was placed in the  dorsal lithotomy position and prepped and draped in the usual sterile fashion.  The bladder was straight catheterized of clear urine.  Speculum was inserted into the vagina and the anterior lip of the cervix was grasped with a single-toothed tenaculum as a means for manipulation.  Paracervical block using 1% lidocaine without epinepherine was injected at the 4 and 8 o'clock positions.      The cervix was dilated to accommodate a operative hysteroscope.  The hysteroscope was advanced to the fundus of the uterus with assistance of hydrodissection, and the above findings were noted.    Using the myosure resector, the endometrial polyps were resected under direct visualization.  In a similar fashion, the right cornua area was sampled as well.  Once satisfactory hysteroscopy was completed, the scope was removed from the cervix.  A sharp curettage was performed of all four quadrants of the uterus until a gritty texture was noted.  Tissue was sent as endometrial curettings.    All instruments were then removed from the vagina.  Tenaculum bite sites were hemostatic.  The patient was transferred to the PACU in stable condition.  All sponge counts were correct x 2 by OR staff.  The patient tolerated the procedure well.    Analilia Angulo MD

## 2023-05-23 NOTE — PHARMACY-ADMISSION MEDICATION HISTORY
Pharmacist Admission Medication History    Admission medication history is complete. The information provided in this note is only as accurate as the sources available at the time of the update.    Medication reconciliation/reorder completed by provider prior to medication history? No    Information Source(s): Patient and CareEverywhere/SureScripts via in-person    Pertinent Information:      Changes made to PTA medication list:    Added: None    Deleted: Baclofen    Changed: Celexa    Medication Affordability:  Not including over the counter (OTC) medications, was there a time in the past 3 months when you did not take your medications as prescribed because of cost?: No    Allergies reviewed with patient and updates made in EHR: yes    Medication History Completed By: Neil Goodman [facility] 5/23/2023 8:49 AM    Prior to Admission medications    Medication Sig Last Dose Taking? Auth Provider Long Term End Date   cholecalciferol (VITAMIN D3) 25 mcg (1000 units) capsule Take 1,000 Units by mouth 5/22/2023 Yes Reported, Patient     citalopram (CELEXA) 10 MG tablet Take 20 mg by mouth daily 5/23/2023 Yes Unknown, Entered By History No    ibuprofen (ADVIL,MOTRIN) 600 MG tablet [IBUPROFEN (ADVIL,MOTRIN) 600 MG TABLET] Take 600 mg by mouth every 6 (six) hours as needed for pain. 5/16/2023 Yes Provider, Historical     norethindrone (AYGESTIN) 5 MG tablet Take 5 mg by mouth daily 5/23/2023 at am Yes Reported, Patient     omeprazole (PRILOSEC) 20 MG DR capsule Take 20 mg by mouth daily 5/23/2023 at am Yes Reported, Patient     SUMAtriptan (IMITREX) 100 MG tablet Take 100 mg by mouth at onset of headache for migraine Past Month Yes Unknown, Entered By History     vitamin B-12 (CYANOCOBALAMIN) 100 MCG tablet Take 1,000 mcg by mouth daily 5/22/2023 Yes Reported, Patient

## 2023-05-23 NOTE — ANESTHESIA POSTPROCEDURE EVALUATION
Patient: Patricia A Pranke    Procedure: Procedure(s):  HYSTEROSCOPY, WITH DILATION AND CURETTAGE, ENDOMETRIAL POLYPECTOMY       Anesthesia Type:  MAC    Note:  Disposition: Outpatient   Postop Pain Control: Uneventful            Sign Out: Well controlled pain   PONV: No   Neuro/Psych: Uneventful            Sign Out: Acceptable/Baseline neuro status   Airway/Respiratory: Uneventful            Sign Out: Acceptable/Baseline resp. status   CV/Hemodynamics: Uneventful            Sign Out: Acceptable CV status; No obvious hypovolemia; No obvious fluid overload   Other NRE: NONE   DID A NON-ROUTINE EVENT OCCUR? No           Last vitals:  Vitals Value Taken Time   /70 05/23/23 1151   Temp 37.3  C (99.2  F) 05/23/23 1130   Pulse 63 05/23/23 1155   Resp 16 05/23/23 1130   SpO2 98 % 05/23/23 1155   Vitals shown include unvalidated device data.    Electronically Signed By: Jenn Camarena MD  May 23, 2023  12:40 PM

## 2023-05-23 NOTE — ANESTHESIA CARE TRANSFER NOTE
Patient: Patricia A Pranke    Procedure: Procedure(s):  HYSTEROSCOPY, WITH DILATION AND CURETTAGE, ENDOMETRIAL POLYPECTOMY       Diagnosis: Abnormal uterine bleeding [N93.9]  Endometrial polyp [N84.0]  Family history of Pringle syndrome [Z80.0]  Diagnosis Additional Information: No value filed.    Anesthesia Type:   MAC     Note:    Oropharynx: oropharynx clear of all foreign objects  Level of Consciousness: drowsy  Oxygen Supplementation: room air    Independent Airway: airway patency satisfactory and stable  Dentition: dentition unchanged  Vital Signs Stable: post-procedure vital signs reviewed and stable  Report to RN Given: handoff report given  Patient transferred to: Phase II    Handoff Report: Identifed the Patient, Identified the Reponsible Provider, Reviewed the pertinent medical history, Discussed the surgical course, Reviewed Intra-OP anesthesia mangement and issues during anesthesia, Set expectations for post-procedure period and Allowed opportunity for questions and acknowledgement of understanding      Vitals:  Vitals Value Taken Time   /71 05/23/23 1130   Temp 37.3  C (99.2  F) 05/23/23 1130   Pulse 75 05/23/23 1131   Resp 16 05/23/23 1130   SpO2 97 % 05/23/23 1131   Vitals shown include unvalidated device data.    Electronically Signed By: YANDY RASCON CRNA  May 23, 2023  11:32 AM

## 2023-05-23 NOTE — ANESTHESIA PREPROCEDURE EVALUATION
Anesthesia Pre-Procedure Evaluation    Patient: Patricia A Pranke   MRN: [id number] : 1975        Procedure : Procedure(s):  HYSTEROSCOPY, WITH DILATION AND CURETTAGE, ENDOMETRIAL POLYPECTOMY          Past Medical History:   Diagnosis Date     Anemia      Menorrhagia       Past Surgical History:   Procedure Laterality Date     HERNIA REPAIR       SINUS SURGERY       TUBAL LIGATION        Allergies   Allergen Reactions     Dye [External Allergen Needs Reconciliation - See Comment] Dizziness     Gadolinium-Containing Contrast Media [Gadolinium Derivatives] Anaphylaxis     Nasonex [Mometasone] Unknown      Social History     Tobacco Use     Smoking status: Former     Smokeless tobacco: Never     Tobacco comments:     Quit 17 years ago   Vaping Use     Vaping status: Not on file   Substance Use Topics     Alcohol use: Yes     Comment: 5-6/week      Wt Readings from Last 1 Encounters:   09/15/20 95.3 kg (210 lb)        Anesthesia Evaluation            ROS/MED HX  ENT/Pulmonary:       Neurologic:       Cardiovascular:       METS/Exercise Tolerance:     Hematologic:     (+) anemia,     Musculoskeletal:       GI/Hepatic:       Renal/Genitourinary:       Endo:     (+) Obesity,     Psychiatric/Substance Use:       Infectious Disease:       Malignancy:       Other:            Physical Exam    Airway  airway exam normal      Mallampati: II    Neck ROM: full   Mouth opening: > 3 cm    Respiratory Devices and Support         Dental           Cardiovascular   cardiovascular exam normal       Rhythm and rate: regular and normal     Pulmonary   pulmonary exam normal        breath sounds clear to auscultation           OUTSIDE LABS:  CBC: No results found for: WBC, HGB, HCT, PLT  BMP: No results found for: NA, POTASSIUM, CHLORIDE, CO2, BUN, CR, GLC  COAGS: No results found for: PTT, INR, FIBR  POC: No results found for: BGM, HCG, HCGS  HEPATIC: No results found for: ALBUMIN, PROTTOTAL, ALT, AST, GGT, ALKPHOS, BILITOTAL,  BILIDIRECT, ELTON  OTHER: No results found for: PH, LACT, A1C, MONSE, PHOS, MAG, LIPASE, AMYLASE, TSH, T4, T3, CRP, SED    Anesthesia Plan    ASA Status:  2      Anesthesia Type: MAC.   Induction: Propofol.           Consents    Anesthesia Plan(s) and associated risks, benefits, and realistic alternatives discussed. Questions answered and patient/representative(s) expressed understanding.    - Discussed:     - Discussed with:  Patient      - Extended Intubation/Ventilatory Support Discussed: No.      - Patient is DNR/DNI Status: No    Use of blood products discussed: No .     Postoperative Care       PONV prophylaxis: Ondansetron (or other 5HT-3)     Comments:    Other Comments:     Discussed possibility of intra-op awareness and need to convert to GA.     Patient history and physical exam reviewed. NPO status appropriate. Focused physical and history performed, pre-op evaluation updated. RBA discussed re: anesthesia and patients questions answered.                 Jenn Camarena MD

## 2023-05-25 LAB
PATH REPORT.COMMENTS IMP SPEC: NORMAL
PATH REPORT.FINAL DX SPEC: NORMAL
PATH REPORT.GROSS SPEC: NORMAL
PATH REPORT.MICROSCOPIC SPEC OTHER STN: NORMAL
PATH REPORT.RELEVANT HX SPEC: NORMAL
PHOTO IMAGE: NORMAL

## 2023-07-18 ENCOUNTER — LAB REQUISITION (OUTPATIENT)
Dept: LAB | Facility: CLINIC | Age: 48
End: 2023-07-18

## 2023-07-18 DIAGNOSIS — E66.9 OBESITY, UNSPECIFIED: ICD-10-CM

## 2023-07-18 LAB
ANION GAP SERPL CALCULATED.3IONS-SCNC: 12 MMOL/L (ref 7–15)
BUN SERPL-MCNC: 10.2 MG/DL (ref 6–20)
CALCIUM SERPL-MCNC: 9.2 MG/DL (ref 8.6–10)
CHLORIDE SERPL-SCNC: 107 MMOL/L (ref 98–107)
CREAT SERPL-MCNC: 1 MG/DL (ref 0.51–0.95)
DEPRECATED HCO3 PLAS-SCNC: 21 MMOL/L (ref 22–29)
GFR SERPL CREATININE-BSD FRML MDRD: 69 ML/MIN/1.73M2
GLUCOSE SERPL-MCNC: 90 MG/DL (ref 70–99)
POTASSIUM SERPL-SCNC: 4.2 MMOL/L (ref 3.4–5.3)
SODIUM SERPL-SCNC: 140 MMOL/L (ref 136–145)

## 2023-07-18 PROCEDURE — 82310 ASSAY OF CALCIUM: CPT | Performed by: PHYSICIAN ASSISTANT

## 2023-07-30 LAB
ABO/RH(D): NORMAL
ANTIBODY SCREEN: NEGATIVE
SPECIMEN EXPIRATION DATE: NORMAL

## 2023-07-31 ENCOUNTER — LAB (OUTPATIENT)
Dept: LAB | Facility: CLINIC | Age: 48
End: 2023-07-31
Payer: COMMERCIAL

## 2023-07-31 DIAGNOSIS — N93.9 ABNORMAL UTERINE BLEEDING (AUB): Primary | ICD-10-CM

## 2023-07-31 LAB
HCT VFR BLD AUTO: 44.4 % (ref 35–47)
HGB BLD-MCNC: 14.4 G/DL (ref 11.7–15.7)

## 2023-07-31 PROCEDURE — 86901 BLOOD TYPING SEROLOGIC RH(D): CPT

## 2023-07-31 PROCEDURE — 86850 RBC ANTIBODY SCREEN: CPT

## 2023-07-31 PROCEDURE — 36415 COLL VENOUS BLD VENIPUNCTURE: CPT

## 2023-07-31 PROCEDURE — 85018 HEMOGLOBIN: CPT

## 2024-04-14 ENCOUNTER — HEALTH MAINTENANCE LETTER (OUTPATIENT)
Age: 49
End: 2024-04-14

## 2024-06-23 ENCOUNTER — HEALTH MAINTENANCE LETTER (OUTPATIENT)
Age: 49
End: 2024-06-23

## 2024-11-01 NOTE — INTERVAL H&P NOTE
I have reviewed the surgical (or preoperative) H&P that is linked to this encounter, and examined the patient. There are no significant changes.    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  Postoperative course and expectations reviewed.  Verbal and written consent obtained - will proceed with planned surgery.    5/23/2023    Analilia Angulo MD      yes

## 2025-07-12 ENCOUNTER — HEALTH MAINTENANCE LETTER (OUTPATIENT)
Age: 50
End: 2025-07-12

## 2025-08-19 ENCOUNTER — LAB REQUISITION (OUTPATIENT)
Dept: LAB | Facility: CLINIC | Age: 50
End: 2025-08-19
Payer: COMMERCIAL

## 2025-08-19 DIAGNOSIS — Z13.6 ENCOUNTER FOR SCREENING FOR CARDIOVASCULAR DISORDERS: ICD-10-CM

## 2025-08-19 LAB
CHOLEST SERPL-MCNC: 188 MG/DL
FASTING STATUS PATIENT QL REPORTED: ABNORMAL
HDLC SERPL-MCNC: 43 MG/DL
LDLC SERPL CALC-MCNC: 119 MG/DL
NONHDLC SERPL-MCNC: 145 MG/DL
TRIGL SERPL-MCNC: 129 MG/DL

## (undated) DEVICE — GLOVE UNDER INDICATOR PI SZ 7.0 LF 41670

## (undated) DEVICE — PREP DYNA-HEX 4% CHG SCRUB 4OZ BOTTLE MDS098710

## (undated) DEVICE — SOL WATER IRRIG 1000ML BOTTLE 2F7114

## (undated) DEVICE — SEAL SET MYOSURE ROD LENS SCOPE SINGLE USE 40-902

## (undated) DEVICE — CUSTOM PACK PERI GYN SMA5BPGHEA

## (undated) DEVICE — GOWN LG DISP 9515

## (undated) DEVICE — DRSG TELFA 3X4" 1050

## (undated) DEVICE — GLOVE BIOGEL PI ULTRATOUCH G SZ 6.5 42165

## (undated) DEVICE — KIT PROCEDURE FLUENT IN/OUT FLOWPAK TISS TRAP FLT-112S

## (undated) DEVICE — Device

## (undated) DEVICE — SOLUTION IRRIG 2B7127 .9NS 3000ML BAG

## (undated) DEVICE — PACK MINOR SINGLE BASIN SSK3001

## (undated) RX ORDER — PROPOFOL 10 MG/ML
INJECTION, EMULSION INTRAVENOUS
Status: DISPENSED
Start: 2023-05-23

## (undated) RX ORDER — ONDANSETRON 2 MG/ML
INJECTION INTRAMUSCULAR; INTRAVENOUS
Status: DISPENSED
Start: 2023-05-23

## (undated) RX ORDER — CEFAZOLIN SODIUM 1 G/3ML
INJECTION, POWDER, FOR SOLUTION INTRAMUSCULAR; INTRAVENOUS
Status: DISPENSED
Start: 2023-05-23

## (undated) RX ORDER — FENTANYL CITRATE 50 UG/ML
INJECTION, SOLUTION INTRAMUSCULAR; INTRAVENOUS
Status: DISPENSED
Start: 2023-05-23

## (undated) RX ORDER — DEXAMETHASONE SODIUM PHOSPHATE 10 MG/ML
INJECTION, EMULSION INTRAMUSCULAR; INTRAVENOUS
Status: DISPENSED
Start: 2023-05-23